# Patient Record
Sex: MALE | Race: WHITE
[De-identification: names, ages, dates, MRNs, and addresses within clinical notes are randomized per-mention and may not be internally consistent; named-entity substitution may affect disease eponyms.]

---

## 2018-02-02 ENCOUNTER — HOSPITAL ENCOUNTER (EMERGENCY)
Dept: HOSPITAL 59 - ER | Age: 33
Discharge: HOME | End: 2018-02-02
Payer: MEDICAID
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
## 2018-02-02 DIAGNOSIS — S61.412A: Primary | ICD-10-CM

## 2018-02-02 PROCEDURE — 96372 THER/PROPH/DIAG INJ SC/IM: CPT

## 2018-02-02 PROCEDURE — 90715 TDAP VACCINE 7 YRS/> IM: CPT

## 2018-02-02 PROCEDURE — 12002 RPR S/N/AX/GEN/TRNK2.6-7.5CM: CPT

## 2018-02-02 PROCEDURE — 99283 EMERGENCY DEPT VISIT LOW MDM: CPT

## 2018-02-02 NOTE — EMERGENCY DEPARTMENT RECORD
History of Present Illness





- General


Chief Complaint: Laceration(s)


Stated Complaint: LAC-LEFT PALM


Time Seen by Provider: 18 12:53


Source: Patient


Mode of Arrival: Ambulatory


Limitations: No limitations





- History of Present Illness


Initial Commments: 





pt cut his hand on metal snippers 16 hrs ago


Onset/Timin


-: Days(s)


Extremity Location: Left: Hand


Place: Home


Context: Accidental


Associated Symptoms: Pain


Treatments Prior to Arrival: Bandage





- [place] Coma Scale


Eye Response: (4) Open spontaneously


Motor Response: (6) Obeys commands


Verbal Response: (5) Oriented


[place] Total: 15





- Related Data


Hx Tetanus Toxoid Vaccination: Yes


Year of Tetanus Vaccination: 


Patient Tetanus UTD (within 5 yrs): No


 Previous Rx's











 Medication  Instructions  Recorded


 


Cephalexin [Keflex] 500 mg PO TID #15 cap 18











 Allergies











Allergy/AdvReac Type Severity Reaction Status Date / Time


 


No Known Drug Allergies Allergy   Verified 18 12:42














Travel Screening





- Travel/Exposure Within Last 30 Days


Have you traveled within the last 30 days?: No





Review of Systems


Reviewed: No additional complaints except as noted below


Constitutional: Reports: As per HPI.  Denies: Chills, Fever, Malaise, Night 

sweats, Weakness, Weight change


Eyes: Reports: As per HPI.  Denies: Eye discharge, Eye pain, Photophobia, 

Vision change


ENT: Reports: As per HPI.  Denies: Congestion, Dental pain, Ear pain, Epistaxis

, Hearing loss, Throat pain


Respiratory: Reports: As per HPI.  Denies: Cough, Dyspnea, Hemoptysis, Stridor, 

Wheezes


Cardiovascular: Reports: As per HPI.  Denies: Arrhythmia, Chest pain, Dyspnea 

on exertion, Edema, Murmurs, Orthopnea, Palpitations, Paroxysmal nocturnal 

dyspnea, Rheumatic Fever, Syncope


Endocrine: Reports: As per HPI.  Denies: Fatigue, Heat or cold intolerance, 

Polydipsia, Polyuria


Gastrointestinal: Reports: As per HPI.  Denies: Abdominal pain, Constipation, 

Diarrhea, Hematemesis, Hematochezia, Melena, Nausea, Vomiting


Genitourinary: Reports: As per HPI.  Denies: Dysuria, Frequency, Hematuria, 

Incontinence, Retention, Testicular pain, Testicular mass, Urgency


Musculoskeletal: Reports: As per HPI.  Denies: Arthralgia, Back pain, Gout, 

Joint swelling, Myalgia, Neck pain


Skin: Reports: As per HPI.  Denies: Bruising, Change in color, Change in hair/

nails, Lesions, Pruritus, Rash


Neurological: Reports: As per HPI.  Denies: Abnormal gait, Confusion, Headache, 

Numbness, Paresthesias, Seizure, Tingling, Tremors, Vertigo, Weakness


Psychiatric: Reports: As per HPI.  Denies: Anxiety, Auditory hallucinations, 

Depression, Homicidal thoughts, Suicidal thoughts, Visual hallucinations


Hematological/Lymphatic: Reports: As per HPI.  Denies: Anemia, Blood Clots, 

Easy bleeding, Easy bruising, Swollen glands





Past Medical History





- SOCIAL HISTORY


Smoking Status: Current every day smoker


Alcohol Use: None


Drug Use: None





- RESPIRATORY


Hx Respiratory Disorders: No





- CARDIOVASCULAR


Hx Cardio Disorders: No





- NEURO


Hx Neuro Disorders: No





- GI


Hx GI Disorders: No





- 


Hx Genitourinary Disorders: No





- ENDOCRINE


Hx Endocrine Disorders: No





- MUSCULOSKELETAL


Hx Musculoskeletal Disorders: No





- PSYCH


Hx Psych Problems: No





- HEMATOLOGY/ONCOLOGY


Hx Hematology/Oncology Disorders: No





Family Medical History


Any Significant Family History?: No





Physical Exam





- General


General Appearance: Alert, Oriented x3, Cooperative, Mild distress





- Head


Head exam: Normal inspection





- Eye


Eye exam: Normal appearance, PERRL, EOMI


Pupils: Normal accommodation





- ENT


ENT exam: Normal exam, Mucous membranes moist, Normal external ear exam, Normal 

orophraynx


Ear exam: Normal external inspection.  negative: External canal tenderness


Nasal Exam: Normal inspection.  negative: Discharge, Sinus tenderness


Mouth exam: Normal external inspection, Tongue normal


Teeth exam: Normal inspection.  negative: Dental caries


Throat exam: Normal inspection.  negative: Tonsillar erythema, Tonsillar exudate





- Neck


Neck exam: Normal inspection, Full ROM.  negative: Tenderness





- Respiratory


Respiratory exam: Normal lung sounds bilaterally.  negative: Respiratory 

distress





- Cardiovascular


Cardiovascular Exam: Regular rate, Normal rhythm, Normal heart sounds





- GI/Abdominal


GI/Abdominal exam: Soft, Normal bowel sounds.  negative: Tenderness





- Rectal


Rectal exam: Deferred





- 


 exam: Deferred





- Extremities


Extremities exam: Normal inspection, Full ROM, Normal capillary refill, 

Tenderness


Image of Hand: 


  __________________________














  __________________________





 1 - flap like laceration 4 cm








- Back


Back exam: Reports: Normal inspection, Full ROM.  Denies: Muscle spasm, Rash 

noted, Tenderness





- Neurological


Neurological exam: Alert, CN II-XII intact, Normal gait, Oriented X3





- Psychiatric


Psychiatric exam: Normal affect, Normal mood





- Skin


Skin exam: Dry, Intact, Normal color, Warm


Type of lesion: Laceration


Distribution of rash: LUE





Course





 Vital Signs











  18





  12:38


 


Temperature 97.5 F L


 


Pulse Rate 76


 


Respiratory 14





Rate 


 


Blood Pressure 127/98


 


Pulse Ox 98














- Reevaluation(s)


Reevaluation #1: 





18 13:57


pt educated to the possibility of infection and flap not healing





Disposition


Disposition: Discharge


Clinical Impression: 


Laceration of palm


Qualifiers:


 Encounter type: initial encounter Laterality: left Qualified Code(s): S61.412A 

- Laceration without foreign body of left hand, initial encounter





Disposition: Home, Self-Care


Condition: (1) Good


Instructions:  Laceration (ED), Care For Your Stitches (ED)


Additional Instructions: 


follow up with family doctor.  return sooner if worse. stitches out in 8 days.


Prescriptions: 


Cephalexin [Keflex] 500 mg PO TID #15 cap





Quality





- Quality Measures


Quality Measures: N/A





- Blood Pressure Screening


Does Patient Have Any of the Following: No


Blood Pressure Classification: Hypertensive Reading


Systolic Measurement: 127


Diastolic Measurement: 98


Screening for High Blood Pressure: < Pre-Hypertensive BP, F/U Documented > [

]


Pre-Hypertensive Follow-up Interventions: Follow-up with rescreen every year.





Laceration - Other





- Time Out


Informed consent:: Informed consent obtained


Confirmed first & last name, , procedure, correct site?: Yes


Start Date:: 18


Start Time:: 13:20





- Location


Location of laceration:: Left, Palmar


Laceration located on:: Hand


Length of laceration:: 4


Length of laceration:: cm





- Clean and Prep


Laceration cleaning method:: Cleansed


Laceration cleaning agent:: Normal Saline





- Local Anesthetic


Lidocaine used:: 1%


Lidocaine dose:: 1 mL


EMLA cream used?: No





- Medication


Medicated for procedure?: No





- Procedural Detail


Tissue detail:: Torn


Foreign body in the wound?: No


Undermining was preformed?: No


Stent applied?: No


Skin suture pattern:: Interrupted


Suture material/size:: 5-0: Nylon


Number of skin sutures:: 5





- Post Procedural Detail


Complications:: Yes (16 hrs old flap)


Procedure Tolerated by Patient:: Well